# Patient Record
Sex: FEMALE | Race: WHITE
[De-identification: names, ages, dates, MRNs, and addresses within clinical notes are randomized per-mention and may not be internally consistent; named-entity substitution may affect disease eponyms.]

---

## 2019-12-30 ENCOUNTER — HOSPITAL ENCOUNTER (EMERGENCY)
Dept: HOSPITAL 44 - ED | Age: 24
Discharge: HOME | End: 2019-12-30
Payer: COMMERCIAL

## 2019-12-30 VITALS — DIASTOLIC BLOOD PRESSURE: 74 MMHG | SYSTOLIC BLOOD PRESSURE: 126 MMHG
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
## 2019-12-30 DIAGNOSIS — M25.511: Primary | ICD-10-CM

## 2019-12-30 PROCEDURE — 99284 EMERGENCY DEPT VISIT MOD MDM: CPT

## 2019-12-30 PROCEDURE — 71046 X-RAY EXAM CHEST 2 VIEWS: CPT

## 2019-12-30 PROCEDURE — 81025 URINE PREGNANCY TEST: CPT

## 2019-12-30 PROCEDURE — 73030 X-RAY EXAM OF SHOULDER: CPT

## 2019-12-30 PROCEDURE — 99283 EMERGENCY DEPT VISIT LOW MDM: CPT

## 2019-12-30 PROCEDURE — 96372 THER/PROPH/DIAG INJ SC/IM: CPT

## 2019-12-30 NOTE — DIAGNOSTIC IMAGING REPORT
PATIENT MR#:   [id number]

PATIENT ACCT#: [id number]

PATIENT NAME:  MICHEAL PFEIFFER

YOB: 1995

REFERRING PHYSICIAN: Priscilla Giron

EXAM DATE:        12/30/2019

ACCESSION NUMBER: [id number]

EXAM DESCRIPTION: CHEST 2VIEW



Examination: PA and lateral chest.



History: Evaluate lung fields.



Comparison exam: None available



Findings: PA and lateral views of the chest demonstrates a normal cardiac and mediastinal silhouette.
 No focal

infiltrate.  No blunting of the costophrenic margins.  Osseous structures are appropriate for age.



Impression: No acute pulmonary process.



Electronically signed by: Chauncey Solorio on 12/30/2019 16:33:57









Read by:        Dr. Chauncey Solorio

Transcribed by:   

Transcribed Date: 

Electronically signed by: Dr. Chauncey Solorio

Date signed: 12/30/2019 4:37:55 PM
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
